# Patient Record
Sex: FEMALE | Race: WHITE | NOT HISPANIC OR LATINO | Employment: OTHER | URBAN - METROPOLITAN AREA
[De-identification: names, ages, dates, MRNs, and addresses within clinical notes are randomized per-mention and may not be internally consistent; named-entity substitution may affect disease eponyms.]

---

## 2017-10-25 ENCOUNTER — TELEPHONE (OUTPATIENT)
Dept: NEUROLOGY | Facility: CLINIC | Age: 42
End: 2017-10-25

## 2017-10-25 ENCOUNTER — OFFICE VISIT (OUTPATIENT)
Dept: NEUROLOGY | Facility: CLINIC | Age: 42
End: 2017-10-25
Payer: MEDICAID

## 2017-10-25 VITALS — WEIGHT: 177 LBS | BODY MASS INDEX: 38.19 KG/M2 | HEIGHT: 57 IN

## 2017-10-25 DIAGNOSIS — Q93.51 ANGELMAN SYNDROME: Chronic | ICD-10-CM

## 2017-10-25 DIAGNOSIS — G40.209 PARTIAL SYMPTOMATIC EPILEPSY WITH COMPLEX PARTIAL SEIZURES, NOT INTRACTABLE, WITHOUT STATUS EPILEPTICUS: Primary | ICD-10-CM

## 2017-10-25 DIAGNOSIS — F72 MR (MENTAL RETARDATION), SEVERE: Chronic | ICD-10-CM

## 2017-10-25 DIAGNOSIS — G40.219 PARTIAL SYMPTOMATIC EPILEPSY WITH COMPLEX PARTIAL SEIZURES, INTRACTABLE, WITHOUT STATUS EPILEPTICUS: ICD-10-CM

## 2017-10-25 DIAGNOSIS — G40.219 PARTIAL EPILEPSY WITH IMPAIRMENT OF CONSCIOUSNESS, INTRACTABLE: Chronic | ICD-10-CM

## 2017-10-25 PROCEDURE — 99203 OFFICE O/P NEW LOW 30 MIN: CPT | Mod: S$PBB,,, | Performed by: STUDENT IN AN ORGANIZED HEALTH CARE EDUCATION/TRAINING PROGRAM

## 2017-10-25 PROCEDURE — 99999 PR PBB SHADOW E&M-EST. PATIENT-LVL II: CPT | Mod: PBBFAC,,, | Performed by: STUDENT IN AN ORGANIZED HEALTH CARE EDUCATION/TRAINING PROGRAM

## 2017-10-25 PROCEDURE — 99212 OFFICE O/P EST SF 10 MIN: CPT | Mod: PBBFAC | Performed by: STUDENT IN AN ORGANIZED HEALTH CARE EDUCATION/TRAINING PROGRAM

## 2017-10-25 RX ORDER — DIVALPROEX SODIUM 500 MG/1
1000 TABLET, FILM COATED, EXTENDED RELEASE ORAL DAILY
Qty: 60 TABLET | Refills: 11 | Status: SHIPPED | OUTPATIENT
Start: 2017-10-25 | End: 2017-10-27 | Stop reason: DRUGHIGH

## 2017-10-25 RX ORDER — HALOPERIDOL 2 MG/1
2 TABLET ORAL ONCE AS NEEDED
Qty: 1 TABLET | Refills: 0 | Status: SHIPPED | OUTPATIENT
Start: 2017-10-25 | End: 2018-11-15

## 2017-10-25 RX ORDER — LEVETIRACETAM 1000 MG/1
TABLET ORAL
Qty: 90 TABLET | Refills: 5 | Status: SHIPPED | OUTPATIENT
Start: 2017-10-25 | End: 2018-06-25 | Stop reason: SDUPTHER

## 2017-10-25 NOTE — TELEPHONE ENCOUNTER
----- Message from Alvaro Aburto sent at 10/25/2017 10:27 AM CDT -----  Contact: Lila apodaca/ [facility] @ ext [phone]  Lila is calling for diagnosis code for haloperidol (HALDOL) 2 MG tablet. Pls call.

## 2017-10-25 NOTE — ASSESSMENT & PLAN NOTE
Continue keppra 1500mg bid and depakote 1000mg bid  Check levels, as well as CBC/CMP, given potential for hepatic toxicity   Haldol 2mg x1 prn for labs; take approximately 30min prior to lab draw  Ok to hold off on EEG at this time.  MRI Brain reviewed.

## 2017-10-25 NOTE — PROGRESS NOTES
"Name: Ting Bartholomew  MRN: [id number]   CSN: [id number]      Date: 10/25/2017    Chief Complaint: complex partial seizures    History of Present Illness (HPI):  Ms. 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  She had previously followed with Dr. Frost in [place].  Her complex partial epilepsy is reports as "staring spells," but she has never had an EEG.  (Her mother states that she would have to be sedated for it.)  These events have occurred since childhood, but where only recognized when she developed generalized tonic clonic seizures, which first occurred four years ago.  She has not had a GTC in about a year and a half.  She was sick about six weeks ago with nausea/vomiting, and missed a few pills, and had a few staring events at that time, but otherwise has been clinically seizure-free for about a year and a half to two years.  She had been taking depakote 1000mg bid and keppra 1500mg bid for several years with good effect, and without obvious side effects.      Of note, she walks well on her own, although has poor depth perception.  She speaks approximately twenty words.  She is able to feed herself with pureed foods, but is usually fed by her family members.  She wears diapers.  She lives at home, but goes to group therapy(?) for socialization a few times a week.    ROS:  Negative for cough, shortness of breath, nausea/vomiting, diarrhea/constipation, or urinary difficulties.  Positive for urinary incontinence.    Past Medical History: The patient  has a past medical history of Angelman syndrome; Developmental delay; Epilepsy (7/18/2014); Mental disorder; and Seizures.    Social History: The patient  reports that she has never smoked. She does not have any smokeless tobacco history on file.    Family History: Their family history includes Diabetes in her mother; Heart disease in her mother; Hyperlipidemia in her mother; " "Hypertension in her mother; No Known Problems in her father.    Allergies: Shellfish containing products     Meds: Depakote 1000mg bid and keppra 1500mg bid    Exam:  Ht 4' 9" (1.448 m)   Wt 80.3 kg (177 lb 0.5 oz)   BMI 38.31 kg/m²     Constitutional  Well-developed, well-nourished, appears stated age.  Edentulous.  Typical hand movements of Angelman's.  Laughing, smiling.  Edentulous.       Cardiovascular  Radial pulses 2+ and symmetric, no LE edema bilaterally   Neurological    * Mental status      - Orientation  Able to state first name     - Memory   Poor     - Attention/concentration  Poor     - Language  Able to repeat "bye bye" when leaving.  Understands when asked to stand up and walk to the door     - Fund of knowledge  Limited             * Cranial nerves       - CN II       - CN III, IV, VI  Extraocular movements full     - CN V  Unable to assess sensation     - CN VII  Face strong and symmetric bilaterally     - CN VIII  Hearing intact bilaterally               * Motor  Muscle bulk normal   * Sensory   Difficult to assess   * Coordination  EMIGDIO    * Gait  Normal casual gait without assistance   * Deep tendon reflexes  2+ and symmetric throughout     Laboratory/Radiological:  - Results:  Lab Visit on 10/25/2017   Component Date Value Ref Range Status    WBC 10/25/2017 5.35  3.90 - 12.70 K/uL Final    RBC 10/25/2017 4.41  4.00 - 5.40 M/uL Final    Hemoglobin 10/25/2017 13.4  12.0 - 16.0 g/dL Final    Hematocrit 10/25/2017 39.7  37.0 - 48.5 % Final    MCV 10/25/2017 90  82 - 98 fL Final    MCH 10/25/2017 30.4  27.0 - 31.0 pg Final    MCHC 10/25/2017 33.8  32.0 - 36.0 g/dL Final    RDW 10/25/2017 12.1  11.5 - 14.5 % Final    Platelets 10/25/2017 131* 150 - 350 K/uL Final    MPV 10/25/2017 10.7  9.2 - 12.9 fL Final    Immature Granulocytes 10/25/2017 0.2  0.0 - 0.5 % Final    Gran # 10/25/2017 2.1  1.8 - 7.7 K/uL Final    Immature Grans (Abs) 10/25/2017 0.01  0.00 - 0.04 K/uL Final    Lymph # " 10/25/2017 2.5  1.0 - 4.8 K/uL Final    Mono # 10/25/2017 0.7  0.3 - 1.0 K/uL Final    Eos # 10/25/2017 0.1  0.0 - 0.5 K/uL Final    Baso # 10/25/2017 0.02  0.00 - 0.20 K/uL Final    nRBC 10/25/2017 0  0 /100 WBC Final    Gran% 10/25/2017 39.0  38.0 - 73.0 % Final    Lymph% 10/25/2017 46.0  18.0 - 48.0 % Final    Mono% 10/25/2017 13.3  4.0 - 15.0 % Final    Eosinophil% 10/25/2017 1.1  0.0 - 8.0 % Final    Basophil% 10/25/2017 0.4  0.0 - 1.9 % Final    Differential Method 10/25/2017 Automated   Final     MRI Brain 10/2014:  Subtle subcortical increased T2 / flair signal in the superior frontal lobes bilaterally as well as the temporal and insular regions which demonstrate no associated hemorrhage or postcontrast enhancement.  Differential considerations include post ictal   state versus infectious or inflammatory process.  Correlation with patient's symptomatology is recommended as well as continued follow-up.    Problem List Items Addressed This Visit        Neuro    MR (mental retardation), severe (Chronic)    Overview     Speaks approximately twenty words, per family         Nonintractable epilepsy with complex partial seizures - Primary    Overview     Staring spells since childhood, first recognized at age 37.         Current Assessment & Plan     Continue keppra 1500mg bid and depakote 1000mg bid  Check levels, as well as CBC/CMP, given potential for hepatic toxicity   Haldol 2mg x1 prn for labs; take approximately 30min prior to lab draw  Ok to hold off on EEG at this time.  MRI Brain reviewed.         Relevant Medications    levETIRAcetam (KEPPRA) 1000 MG tablet    Other Relevant Orders    Comprehensive metabolic panel    CBC auto differential (Completed)    Valproic Acid    Levetiracetam level       Genetic    Angelman syndrome (Chronic)      Other Visit Diagnoses     Partial symptomatic epilepsy with complex partial seizures, intractable, without status epilepticus        Relevant Medications     levETIRAcetam (KEPPRA) 1000 MG tablet    Other Relevant Orders    Comprehensive metabolic panel    CBC auto differential (Completed)    Valproic Acid    Levetiracetam level    Partial epilepsy with impairment of consciousness, intractable  (Chronic)       Relevant Medications    levETIRAcetam (KEPPRA) 1000 MG tablet    Other Relevant Orders    Comprehensive metabolic panel    CBC auto differential (Completed)    Valproic Acid    Levetiracetam level        RTC 6 mo, or sooner, prn.    Isabel Byrne MD  [facility]  PGY-3

## 2017-10-27 RX ORDER — DIVALPROEX SODIUM 500 MG/1
TABLET, FILM COATED, EXTENDED RELEASE ORAL
Qty: 60 TABLET | Refills: 11 | Status: SHIPPED | OUTPATIENT
Start: 2017-10-27 | End: 2018-06-25 | Stop reason: SDUPTHER

## 2017-10-27 NOTE — TELEPHONE ENCOUNTER
Labs reviewed.  Given depakote level of 127, will decrease depakote from 100mg bid to 500mg daily plus 1000mg qhs.  Patient's mother called; plan discussed, and patient's mother amenable.    Isabel Byrne MD

## 2017-12-26 ENCOUNTER — TELEPHONE (OUTPATIENT)
Dept: NEUROLOGY | Facility: CLINIC | Age: 42
End: 2017-12-26

## 2017-12-26 NOTE — TELEPHONE ENCOUNTER
----- Message from Gio Washburn sent at 12/14/2017 12:46 PM CST -----  [facility]   [facility]  Faxed over a 90L form that was not signed by DR. Byrne  On 12/7/2017 and have not received forms back as of now     Carlice with [facility] can be reached at [phone]

## 2017-12-27 NOTE — TELEPHONE ENCOUNTER
----- Message from Clare Peace sent at 12/27/2017  1:27 PM CST -----  Contact: Juliet apodaca/[facility] [place]  Juliet is calling in regards to getting a status on the Doctor signing the 90L for pt.    Juliet would like a call back at [phone].    Thank you

## 2018-01-05 NOTE — TELEPHONE ENCOUNTER
This form (90L) was completed at last visit, 10/25/17, and handed back to the patient's mother and sister.    No new form has been received.    Isabel Byrne MD  [facility]  PGY-3

## 2018-06-25 DIAGNOSIS — G40.219 PARTIAL EPILEPSY WITH IMPAIRMENT OF CONSCIOUSNESS, INTRACTABLE: Chronic | ICD-10-CM

## 2018-06-25 DIAGNOSIS — G40.219 PARTIAL SYMPTOMATIC EPILEPSY WITH COMPLEX PARTIAL SEIZURES, INTRACTABLE, WITHOUT STATUS EPILEPTICUS: ICD-10-CM

## 2018-06-25 NOTE — TELEPHONE ENCOUNTER
----- Message from Ju Layton sent at 6/25/2018 10:51 AM CDT -----  Rx Refill/Request     Is this a Refill or New Rx:  refill  Rx Name and Strength:  levETIRAcetam (KEPPRA) 1000 MG tablet and  divalproex ER (DEPAKOTE) 500 MG Tb24  Preferred Pharmacy with phone number:    [facility]/pharm[facility] #[id number] - [place] - [address]  [place] [address]  Phone: [phone] Fax: [phone]     Communication Preference:Milka (mother) @ [phone]  Additional Information:

## 2018-06-27 RX ORDER — DIVALPROEX SODIUM 500 MG/1
TABLET, FILM COATED, EXTENDED RELEASE ORAL
Qty: 60 TABLET | Refills: 11 | Status: SHIPPED | OUTPATIENT
Start: 2018-06-27 | End: 2018-11-15 | Stop reason: SDUPTHER

## 2018-06-27 RX ORDER — LEVETIRACETAM 1000 MG/1
TABLET ORAL
Qty: 90 TABLET | Refills: 5 | Status: SHIPPED | OUTPATIENT
Start: 2018-06-27 | End: 2018-11-15 | Stop reason: SDUPTHER

## 2018-11-15 PROBLEM — Z51.81 MEDICATION MONITORING ENCOUNTER: Status: ACTIVE | Noted: 2018-11-15

## 2021-05-06 ENCOUNTER — PATIENT MESSAGE (OUTPATIENT)
Dept: RESEARCH | Facility: HOSPITAL | Age: 46
End: 2021-05-06

## 2021-07-01 ENCOUNTER — PATIENT MESSAGE (OUTPATIENT)
Dept: ADMINISTRATIVE | Facility: OTHER | Age: 46
End: 2021-07-01

## 2023-07-05 NOTE — PROGRESS NOTES
Name: Ting Bartholomew  MRN: [id number]   CSN: [id number]      Date: 07/06/2023    Referring physician:  Self     Chief Complaint / Interval History: Abnormal Movements/Epilepsy      History of Present Illness (HPI):    Ms. Bartholomew is a 48 yo with Angelman's who presents to [facility]. She also has epilepsy (general and partial seizures) for which she is on Depakote and Keppra. Her seizures have been well controlled for some time. She rarely will have staring off spells that are short in duration (last on Sunday). She has not had any side effects to her current medication regimen. She has no prior neuroleptic exposure. She does have chronic diarrhea but stays well hydrated. She is eating well without issue.     Current Epilepsy Medications:  Keppra 1500mg BID  Depakote ER 500mg qAM and 1000mg qHS    Prior [facility] Medication Trials:Past Medical History: The patient  has a past medical history of Angelman syndrome, Developmental delay, Epilepsy (7/18/2014), Mental disorder, and Seizures. Scoliosis, microcephaly    Relevant Surgical History:   Past Surgical History:   Procedure Laterality Date    DENTAL SURGERY      EYE SURGERY         Social History: The patient  reports that she has never smoked. She does not have any smokeless tobacco history on file.    Family History: Their family history includes Cancer in her mother; Diabetes in her mother; Heart disease in her mother; Hyperlipidemia in her mother; Hypertension in her mother; Kidney disease in her mother; No Known Problems in her father.  Mother with endometrial cancer.     Allergies: Shellfish containing products     Meds:   Current Outpatient Medications on File Prior to Visit   Medication Sig Dispense Refill    vitamin D 1000 units Tab Take 185 mg by mouth once daily.      [DISCONTINUED] divalproex ER (DEPAKOTE) 500 MG Tb24 PLEASE TAKE 1 TABLET (500MG) IN THE MORNING, AND 2 TABLETS (1000MG) AT NIGHT. 90 tablet 11    [DISCONTINUED] levETIRAcetam (KEPPRA) 750 MG Tab  TAKE 2 TABLETS TWICE A DAY FOR SEIZURES. 120 tablet 11    [DISCONTINUED] melatonin 5 mg Tab Take 10 mg by mouth every evening.       No current facility-administered medications on file prior to visit.       Exam:  Unable to obtain vitals.   Limited exam due to patient cooperation.   Awake, minimally verbal  Moving all extremities spontaneously       Medical Record Review:  Labs, imaging and prior notes reviewed independently.     MRI Brain 10/2014  Subtle subcortical increased T2 / flair signal in the superior frontal lobes bilaterally as well as the temporal and insular regions which demonstrate no associated hemorrhage or postcontrast enhancement.  Differential considerations include post ictal state versus infectious or inflammatory process.  Correlation with patient's symptomatology is recommended as well as continued follow-up.    Diagnoses:          1. Partial symptomatic epilepsy with complex partial seizures, not intractable, without status epilepticus  divalproex 500 MG Tb24    levETIRAcetam (KEPPRA) 750 MG Tab      2. Seizure disorder  divalproex 500 MG Tb24    levETIRAcetam (KEPPRA) 750 MG Tab      3. Insomnia, unspecified type  melatonin (MELATIN) 5 mg      4. Angelman's syndrome            Assessment:  Ms. Bartholomew is a 48 yo woman with angelman's and epilepsy who presents to [facility] care. Her seizures are well managed on her current medication regimen. She is thought to have both partial and generalized seizures.     Plan:  - For epilepsy, Continue LEV 1500mg BID and VPA /1000.  - Consider VPA level in the future if able to obtain.  - In the future, TPM can be considered. Epidiolex is also commonly used in these cases.   - For insomnia, continue melatonin.     RTC in 6 months to see me.     Alison Coelho MD  Division of Movement and Memory Disorders  [facility]  [phone]

## 2023-07-06 ENCOUNTER — OFFICE VISIT (OUTPATIENT)
Dept: NEUROLOGY | Facility: CLINIC | Age: 48
End: 2023-07-06
Payer: MEDICAID

## 2023-07-06 VITALS — HEIGHT: 57 IN | WEIGHT: 178.81 LBS | BODY MASS INDEX: 38.58 KG/M2

## 2023-07-06 DIAGNOSIS — Q93.51 ANGELMAN'S SYNDROME: ICD-10-CM

## 2023-07-06 DIAGNOSIS — G47.00 INSOMNIA, UNSPECIFIED TYPE: ICD-10-CM

## 2023-07-06 DIAGNOSIS — G40.209 PARTIAL SYMPTOMATIC EPILEPSY WITH COMPLEX PARTIAL SEIZURES, NOT INTRACTABLE, WITHOUT STATUS EPILEPTICUS: Primary | ICD-10-CM

## 2023-07-06 DIAGNOSIS — G40.909 SEIZURE DISORDER: ICD-10-CM

## 2023-07-06 PROCEDURE — 99999 PR PBB SHADOW E&M-EST. PATIENT-LVL II: ICD-10-PCS | Mod: PBBFAC,,, | Performed by: STUDENT IN AN ORGANIZED HEALTH CARE EDUCATION/TRAINING PROGRAM

## 2023-07-06 PROCEDURE — 99204 OFFICE O/P NEW MOD 45 MIN: CPT | Mod: S$PBB,,, | Performed by: STUDENT IN AN ORGANIZED HEALTH CARE EDUCATION/TRAINING PROGRAM

## 2023-07-06 PROCEDURE — 99999 PR PBB SHADOW E&M-EST. PATIENT-LVL II: CPT | Mod: PBBFAC,,, | Performed by: STUDENT IN AN ORGANIZED HEALTH CARE EDUCATION/TRAINING PROGRAM

## 2023-07-06 PROCEDURE — 99204 PR OFFICE/OUTPT VISIT, NEW, LEVL IV, 45-59 MIN: ICD-10-PCS | Mod: S$PBB,,, | Performed by: STUDENT IN AN ORGANIZED HEALTH CARE EDUCATION/TRAINING PROGRAM

## 2023-07-06 PROCEDURE — 1159F PR MEDICATION LIST DOCUMENTED IN MEDICAL RECORD: ICD-10-PCS | Mod: CPTII,,, | Performed by: STUDENT IN AN ORGANIZED HEALTH CARE EDUCATION/TRAINING PROGRAM

## 2023-07-06 PROCEDURE — 99212 OFFICE O/P EST SF 10 MIN: CPT | Mod: PBBFAC | Performed by: STUDENT IN AN ORGANIZED HEALTH CARE EDUCATION/TRAINING PROGRAM

## 2023-07-06 PROCEDURE — 3008F BODY MASS INDEX DOCD: CPT | Mod: CPTII,,, | Performed by: STUDENT IN AN ORGANIZED HEALTH CARE EDUCATION/TRAINING PROGRAM

## 2023-07-06 PROCEDURE — 3008F PR BODY MASS INDEX (BMI) DOCUMENTED: ICD-10-PCS | Mod: CPTII,,, | Performed by: STUDENT IN AN ORGANIZED HEALTH CARE EDUCATION/TRAINING PROGRAM

## 2023-07-06 PROCEDURE — 1159F MED LIST DOCD IN RCRD: CPT | Mod: CPTII,,, | Performed by: STUDENT IN AN ORGANIZED HEALTH CARE EDUCATION/TRAINING PROGRAM

## 2023-07-06 RX ORDER — DIVALPROEX SODIUM 500 MG/1
TABLET, FILM COATED, EXTENDED RELEASE ORAL
Qty: 90 TABLET | Refills: 11 | Status: SHIPPED | OUTPATIENT
Start: 2023-07-06

## 2023-07-06 RX ORDER — LEVETIRACETAM 750 MG/1
TABLET ORAL
Qty: 120 TABLET | Refills: 11 | Status: SHIPPED | OUTPATIENT
Start: 2023-07-06

## 2023-07-06 RX ORDER — ACETAMINOPHEN 500 MG
10 TABLET ORAL NIGHTLY
Qty: 30 TABLET | Refills: 11 | Status: SHIPPED | OUTPATIENT
Start: 2023-07-06

## 2024-01-29 NOTE — PROGRESS NOTES
Name: Ting Bartholomew  MRN: [id number]   CSN: [id number]      Date: 01/31/2024    Referring physician:  Self     Chief Complaint / Interval History: Abnormal Movements/Epilepsy    History of Present Illness (HPI):    Ms. Bartholomew is a 48 yo with Angelman's who presents to [facility]. She also has epilepsy (general and partial seizures) for which she is on Depakote and Keppra. Her seizures have been well controlled for some time. She rarely will have staring off spells that are short in duration (last on Sunday). She has not had any side effects to her current medication regimen. She has no prior neuroleptic exposure. She does have chronic diarrhea but stays well hydrated. She is eating well without issue.     Interval History:  Ms. Bartholomew presents for follow-up with her mother. She has had very infrequent BT absence seizures. No changes in AEDs.   Sleeping has improved on melatonin.   No behavioral issues that are of concern.   No tremor or walking issues. No falls.     Current Epilepsy Medications:  Keppra 1500mg BID  Depakote ER 500mg qAM and 1000mg qHS    Prior [facility] Medication Trials:Past Medical History: The patient  has a past medical history of Angelman syndrome, Developmental delay, Epilepsy (7/18/2014), Mental disorder, and Seizures. Scoliosis, microcephaly    Relevant Surgical History:   Past Surgical History:   Procedure Laterality Date    DENTAL SURGERY      EYE SURGERY         Social History: The patient  reports that she has never smoked. She does not have any smokeless tobacco history on file.    Family History: Their family history includes Cancer in her mother; Diabetes in her mother; Heart disease in her mother; Hyperlipidemia in her mother; Hypertension in her mother; Kidney disease in her mother; No Known Problems in her father.  Mother with endometrial cancer.     Allergies: Shellfish containing products     Meds:   Current Outpatient Medications on File Prior to Visit   Medication Sig Dispense  Refill    divalproex 500 MG Tb24 PLEASE TAKE 1 TABLET (500MG) IN THE MORNING, AND 2 TABLETS (1000MG) AT NIGHT. 90 tablet 11    levETIRAcetam (KEPPRA) 750 MG Tab TAKE 2 TABLETS TWICE A DAY FOR SEIZURES. 120 tablet 11    melatonin (MELATIN) 5 mg Take 2 tablets (10 mg total) by mouth every evening. 30 tablet 11    vitamin D 1000 units Tab Take 1,000 Units by mouth once daily.       No current facility-administered medications on file prior to visit.       Exam:  Unable to obtain vitals.   Limited exam due to patient cooperation.   Awake, minimally verbal  Moving all extremities spontaneously - rocking       Medical Record Review:  Labs, imaging and prior notes reviewed independently.     MRI Brain 10/2014  Subtle subcortical increased T2 / flair signal in the superior frontal lobes bilaterally as well as the temporal and insular regions which demonstrate no associated hemorrhage or postcontrast enhancement.  Differential considerations include post ictal state versus infectious or inflammatory process.  Correlation with patient's symptomatology is recommended as well as continued follow-up.    Diagnoses:          1. Seizure disorder        2. Angelman's syndrome              Assessment:  Ms. Bartholomew is a 49 yo woman with angelman's and epilepsy who presents [facility]. Her seizures are well managed on her current medication regimen. She is thought to have both partial and generalized seizures.     Plan:  - For epilepsy, Continue LEV 1500mg BID and VPA /1000.  - Consider VPA level in the future if able to obtain.  - In the future, TPM can be considered. Epidiolex is also commonly used in these cases.   - For insomnia, continue melatonin.     RTC in 6 months to see me.     Alison Coelho MD  Division of Movement and Memory Disorders  [facility]  [phone]

## 2024-01-31 ENCOUNTER — OFFICE VISIT (OUTPATIENT)
Dept: NEUROLOGY | Facility: CLINIC | Age: 49
End: 2024-01-31
Payer: MEDICAID

## 2024-01-31 VITALS — HEIGHT: 57 IN | BODY MASS INDEX: 36.62 KG/M2 | WEIGHT: 169.75 LBS | RESPIRATION RATE: 18 BRPM

## 2024-01-31 DIAGNOSIS — Q93.51 ANGELMAN'S SYNDROME: ICD-10-CM

## 2024-01-31 DIAGNOSIS — G40.909 SEIZURE DISORDER: Primary | ICD-10-CM

## 2024-01-31 PROCEDURE — 99214 OFFICE O/P EST MOD 30 MIN: CPT | Mod: S$PBB,,, | Performed by: STUDENT IN AN ORGANIZED HEALTH CARE EDUCATION/TRAINING PROGRAM

## 2024-01-31 PROCEDURE — 99213 OFFICE O/P EST LOW 20 MIN: CPT | Mod: PBBFAC | Performed by: STUDENT IN AN ORGANIZED HEALTH CARE EDUCATION/TRAINING PROGRAM

## 2024-01-31 PROCEDURE — 99999 PR PBB SHADOW E&M-EST. PATIENT-LVL III: CPT | Mod: PBBFAC,,, | Performed by: STUDENT IN AN ORGANIZED HEALTH CARE EDUCATION/TRAINING PROGRAM

## 2024-01-31 PROCEDURE — 1159F MED LIST DOCD IN RCRD: CPT | Mod: CPTII,,, | Performed by: STUDENT IN AN ORGANIZED HEALTH CARE EDUCATION/TRAINING PROGRAM

## 2024-01-31 PROCEDURE — G2211 COMPLEX E/M VISIT ADD ON: HCPCS | Mod: S$PBB,,, | Performed by: STUDENT IN AN ORGANIZED HEALTH CARE EDUCATION/TRAINING PROGRAM

## 2024-01-31 PROCEDURE — 3008F BODY MASS INDEX DOCD: CPT | Mod: CPTII,,, | Performed by: STUDENT IN AN ORGANIZED HEALTH CARE EDUCATION/TRAINING PROGRAM

## 2024-07-07 DIAGNOSIS — G40.909 SEIZURE DISORDER: ICD-10-CM

## 2024-07-07 DIAGNOSIS — G40.209 PARTIAL SYMPTOMATIC EPILEPSY WITH COMPLEX PARTIAL SEIZURES, NOT INTRACTABLE, WITHOUT STATUS EPILEPTICUS: ICD-10-CM

## 2024-07-08 RX ORDER — LEVETIRACETAM 750 MG/1
TABLET ORAL
Qty: 120 TABLET | Refills: 11 | Status: SHIPPED | OUTPATIENT
Start: 2024-07-08

## 2024-07-15 DIAGNOSIS — G40.209 PARTIAL SYMPTOMATIC EPILEPSY WITH COMPLEX PARTIAL SEIZURES, NOT INTRACTABLE, WITHOUT STATUS EPILEPTICUS: ICD-10-CM

## 2024-07-15 DIAGNOSIS — G40.909 SEIZURE DISORDER: ICD-10-CM

## 2024-07-15 RX ORDER — DIVALPROEX SODIUM 500 MG/1
TABLET, DELAYED RELEASE ORAL
Qty: 90 TABLET | Refills: 11 | Status: SHIPPED | OUTPATIENT
Start: 2024-07-15

## 2024-08-07 ENCOUNTER — OFFICE VISIT (OUTPATIENT)
Dept: NEUROLOGY | Facility: CLINIC | Age: 49
End: 2024-08-07
Payer: MEDICAID

## 2024-08-07 VITALS — BODY MASS INDEX: 38.52 KG/M2 | HEIGHT: 57 IN | WEIGHT: 178.56 LBS

## 2024-08-07 DIAGNOSIS — Q93.51 ANGELMAN'S SYNDROME: Primary | ICD-10-CM

## 2024-08-07 DIAGNOSIS — G47.00 INSOMNIA, UNSPECIFIED TYPE: ICD-10-CM

## 2024-08-07 DIAGNOSIS — G40.909 SEIZURE DISORDER: ICD-10-CM

## 2024-08-07 DIAGNOSIS — G40.209 PARTIAL SYMPTOMATIC EPILEPSY WITH COMPLEX PARTIAL SEIZURES, NOT INTRACTABLE, WITHOUT STATUS EPILEPTICUS: ICD-10-CM

## 2024-08-07 PROCEDURE — 99213 OFFICE O/P EST LOW 20 MIN: CPT | Mod: PBBFAC | Performed by: STUDENT IN AN ORGANIZED HEALTH CARE EDUCATION/TRAINING PROGRAM

## 2024-08-07 PROCEDURE — 99214 OFFICE O/P EST MOD 30 MIN: CPT | Mod: S$PBB,,, | Performed by: STUDENT IN AN ORGANIZED HEALTH CARE EDUCATION/TRAINING PROGRAM

## 2024-08-07 PROCEDURE — 99999 PR PBB SHADOW E&M-EST. PATIENT-LVL III: CPT | Mod: PBBFAC,,, | Performed by: STUDENT IN AN ORGANIZED HEALTH CARE EDUCATION/TRAINING PROGRAM

## 2024-08-07 PROCEDURE — 1159F MED LIST DOCD IN RCRD: CPT | Mod: CPTII,,, | Performed by: STUDENT IN AN ORGANIZED HEALTH CARE EDUCATION/TRAINING PROGRAM

## 2024-08-07 PROCEDURE — 3008F BODY MASS INDEX DOCD: CPT | Mod: CPTII,,, | Performed by: STUDENT IN AN ORGANIZED HEALTH CARE EDUCATION/TRAINING PROGRAM

## 2024-08-07 PROCEDURE — G2211 COMPLEX E/M VISIT ADD ON: HCPCS | Mod: S$PBB,,, | Performed by: STUDENT IN AN ORGANIZED HEALTH CARE EDUCATION/TRAINING PROGRAM

## 2024-08-07 RX ORDER — LOPERAMIDE HCL 2 MG
2 TABLET ORAL 4 TIMES DAILY PRN
COMMUNITY

## 2024-08-07 RX ORDER — ACETAMINOPHEN 325 MG/1
325 TABLET ORAL EVERY 6 HOURS PRN
COMMUNITY

## 2025-02-13 ENCOUNTER — HOSPITAL ENCOUNTER (EMERGENCY)
Facility: HOSPITAL | Age: 50
Discharge: HOME | End: 2025-02-13
Attending: FAMILY MEDICINE
Payer: MEDICAID

## 2025-02-13 VITALS
BODY MASS INDEX: 38.64 KG/M2 | RESPIRATION RATE: 22 BRPM | DIASTOLIC BLOOD PRESSURE: 90 MMHG | OXYGEN SATURATION: 99 % | SYSTOLIC BLOOD PRESSURE: 150 MMHG | WEIGHT: 178.56 LBS | TEMPERATURE: 98 F | HEART RATE: 103 BPM

## 2025-02-13 DIAGNOSIS — R22.41 LOCALIZED SWELLING OF RIGHT LOWER LEG: ICD-10-CM

## 2025-02-13 DIAGNOSIS — M79.672 LEFT FOOT PAIN: ICD-10-CM

## 2025-02-13 DIAGNOSIS — M79.605 LEFT LEG PAIN: ICD-10-CM

## 2025-02-13 DIAGNOSIS — M25.572 LEFT ANKLE PAIN: ICD-10-CM

## 2025-02-13 DIAGNOSIS — M79.89 PAIN AND SWELLING OF LEFT LOWER LEG: ICD-10-CM

## 2025-02-13 DIAGNOSIS — M79.662 PAIN AND SWELLING OF LEFT LOWER LEG: ICD-10-CM

## 2025-02-13 DIAGNOSIS — L03.116 CELLULITIS OF LEFT FOOT: Primary | ICD-10-CM

## 2025-02-13 LAB
ALBUMIN SERPL BCP-MCNC: 3.5 G/DL (ref 3.5–5.2)
ALP SERPL-CCNC: 61 U/L (ref 40–150)
ALT SERPL W/O P-5'-P-CCNC: 9 U/L (ref 10–44)
ANION GAP SERPL CALC-SCNC: 7 MMOL/L (ref 8–16)
AST SERPL-CCNC: 14 U/L (ref 10–40)
BASOPHILS # BLD AUTO: 0.01 K/UL (ref 0–0.2)
BASOPHILS NFR BLD: 0.2 % (ref 0–1.9)
BILIRUB SERPL-MCNC: 0.2 MG/DL (ref 0.1–1)
BUN SERPL-MCNC: 11 MG/DL (ref 6–20)
CALCIUM SERPL-MCNC: 9.1 MG/DL (ref 8.7–10.5)
CHLORIDE SERPL-SCNC: 107 MMOL/L (ref 95–110)
CO2 SERPL-SCNC: 27 MMOL/L (ref 23–29)
CREAT SERPL-MCNC: 0.6 MG/DL (ref 0.5–1.4)
DIFFERENTIAL METHOD BLD: ABNORMAL
EOSINOPHIL # BLD AUTO: 0.1 K/UL (ref 0–0.5)
EOSINOPHIL NFR BLD: 1 % (ref 0–8)
ERYTHROCYTE [DISTWIDTH] IN BLOOD BY AUTOMATED COUNT: 13.2 % (ref 11.5–14.5)
EST. GFR  (NO RACE VARIABLE): >60 ML/MIN/1.73 M^2
GLUCOSE SERPL-MCNC: 97 MG/DL (ref 70–110)
HCT VFR BLD AUTO: 40.7 % (ref 37–48.5)
HGB BLD-MCNC: 13.3 G/DL (ref 12–16)
IMM GRANULOCYTES # BLD AUTO: 0.03 K/UL (ref 0–0.04)
IMM GRANULOCYTES NFR BLD AUTO: 0.6 % (ref 0–0.5)
LYMPHOCYTES # BLD AUTO: 2.2 K/UL (ref 1–4.8)
LYMPHOCYTES NFR BLD: 45.2 % (ref 18–48)
MCH RBC QN AUTO: 29.3 PG (ref 27–31)
MCHC RBC AUTO-ENTMCNC: 32.7 G/DL (ref 32–36)
MCV RBC AUTO: 90 FL (ref 82–98)
MONOCYTES # BLD AUTO: 0.7 K/UL (ref 0.3–1)
MONOCYTES NFR BLD: 13.5 % (ref 4–15)
NEUTROPHILS # BLD AUTO: 2 K/UL (ref 1.8–7.7)
NEUTROPHILS NFR BLD: 39.5 % (ref 38–73)
NRBC BLD-RTO: 0 /100 WBC
PLATELET # BLD AUTO: 165 K/UL (ref 150–450)
PMV BLD AUTO: 9.6 FL (ref 9.2–12.9)
POTASSIUM SERPL-SCNC: 4.2 MMOL/L (ref 3.5–5.1)
PROT SERPL-MCNC: 7.1 G/DL (ref 6–8.4)
RBC # BLD AUTO: 4.54 M/UL (ref 4–5.4)
SODIUM SERPL-SCNC: 141 MMOL/L (ref 136–145)
URATE SERPL-MCNC: 3.5 MG/DL (ref 2.4–5.7)
WBC # BLD AUTO: 4.96 K/UL (ref 3.9–12.7)

## 2025-02-13 PROCEDURE — 99285 EMERGENCY DEPT VISIT HI MDM: CPT | Mod: 25

## 2025-02-13 PROCEDURE — 80053 COMPREHEN METABOLIC PANEL: CPT | Performed by: NURSE PRACTITIONER

## 2025-02-13 PROCEDURE — 85025 COMPLETE CBC W/AUTO DIFF WBC: CPT | Performed by: NURSE PRACTITIONER

## 2025-02-13 PROCEDURE — 84550 ASSAY OF BLOOD/URIC ACID: CPT | Performed by: NURSE PRACTITIONER

## 2025-02-13 RX ORDER — NAPROXEN 500 MG/1
500 TABLET ORAL EVERY 12 HOURS PRN
Qty: 10 TABLET | Refills: 0 | Status: SHIPPED | OUTPATIENT
Start: 2025-02-13

## 2025-02-13 RX ORDER — CLINDAMYCIN HYDROCHLORIDE 300 MG/1
300 CAPSULE ORAL EVERY 6 HOURS
Qty: 28 CAPSULE | Refills: 0 | Status: SHIPPED | OUTPATIENT
Start: 2025-02-13 | End: 2025-02-20

## 2025-02-13 NOTE — ED PROVIDER NOTES
Encounter Date: 2/13/2025       History     Chief Complaint   Patient presents with    Joint Swelling     Special needs patient to ED with family complaining of left ankle/foot swelling that began 2-3 days. Unknown if traumatic in nature but able to bear weight and walk.     Ting Bartholomew is a 49 y.o. female with PMH of Angelman Syndrome, epilepsy, seizure disorder presenting to the ED for evaluation of right leg swelling.  Patient presents with mother who reports that she noticed about 3 days ago swelling in her R foot. She denies injury or any known trauma. Typically, if she falls, she needs help getting up. Swelling has progressively worsened and no includes her lower leg up to her calf. She feels like it is painful for her bc she grimaces with movement or touch. She has redness over the dorsum of her foot; mother concerned that she might have been bitten by a spider but there is no visible bite debbie.     The history is provided by a relative.     Review of patient's allergies indicates:   Allergen Reactions    Shellfish containing products Diarrhea     Past Medical History:   Diagnosis Date    Angelman syndrome     Developmental delay     Epilepsy 7/18/2014    Mental disorder     Seizures      Past Surgical History:   Procedure Laterality Date    DENTAL SURGERY      EYE SURGERY       Family History   Problem Relation Name Age of Onset    Kidney disease Mother      Cancer Mother          endometrial    Diabetes Mother      Hypertension Mother      Hyperlipidemia Mother      Heart disease Mother      No Known Problems Father       Social History     Tobacco Use    Smoking status: Never     Review of Systems   Unable to perform ROS: Patient nonverbal       Physical Exam     Initial Vitals   BP Pulse Resp Temp SpO2   02/13/25 1100 02/13/25 1100 02/13/25 1100 02/13/25 1101 02/13/25 1100   (!) 150/90 103 20 98.3 °F (36.8 °C) 97 %      MAP       --                Physical Exam    Nursing note and vitals  reviewed.  Constitutional: She appears well-developed and well-nourished.   HENT:   Head: Normocephalic and atraumatic.   Eyes: Conjunctivae and EOM are normal. Pupils are equal, round, and reactive to light.   Neck: Neck supple.   Cardiovascular:  Normal rate, regular rhythm, normal heart sounds and intact distal pulses.           Pulmonary/Chest: Breath sounds normal.   Abdominal: Abdomen is soft. Bowel sounds are normal.   Musculoskeletal:         General: Normal range of motion.      Cervical back: Neck supple.      Right lower leg: Swelling present. Edema present.      Right ankle: Swelling present.      Right foot: Normal capillary refill. Swelling (R dorsal foot) present. Normal pulse.     Neurological: She is alert and oriented to person, place, and time. She has normal strength.   Skin: Skin is warm and dry.   Psychiatric: She has a normal mood and affect. Her behavior is normal. Judgment and thought content normal.         ED Course   Procedures  Labs Reviewed   CBC W/ AUTO DIFFERENTIAL - Abnormal       Result Value    WBC 4.96      RBC 4.54      Hemoglobin 13.3      Hematocrit 40.7      MCV 90      MCH 29.3      MCHC 32.7      RDW 13.2      Platelets 165      MPV 9.6      Immature Granulocytes 0.6 (*)     Gran # (ANC) 2.0      Immature Grans (Abs) 0.03      Lymph # 2.2      Mono # 0.7      Eos # 0.1      Baso # 0.01      nRBC 0      Gran % 39.5      Lymph % 45.2      Mono % 13.5      Eosinophil % 1.0      Basophil % 0.2      Differential Method Automated     COMPREHENSIVE METABOLIC PANEL - Abnormal    Sodium 141      Potassium 4.2      Chloride 107      CO2 27      Glucose 97      BUN 11      Creatinine 0.6      Calcium 9.1      Total Protein 7.1      Albumin 3.5      Total Bilirubin 0.2      Alkaline Phosphatase 61      AST 14      ALT 9 (*)     eGFR >60      Anion Gap 7 (*)    URIC ACID    Uric Acid 3.5            Imaging Results              US Lower Extremity Veins Left (Final result)  Result time  02/13/25 13:24:06      Final result by Margareth Huang MD (02/13/25 13:24:06)                   Impression:      No evidence of deep venous thrombosis in the left lower extremity.      Electronically signed by: Margareth Huang MD  Date:    02/13/2025  Time:    13:24               Narrative:    EXAMINATION:  US LOWER EXTREMITY VEINS LEFT    CLINICAL HISTORY:  Pain in left lower leg    TECHNIQUE:  Duplex and color flow Doppler evaluation and graded compression of the left lower extremity veins was performed.    COMPARISON:  None    FINDINGS:  Left thigh veins: The common femoral, femoral, popliteal, upper greater saphenous, and deep femoral veins are patent and free of thrombus. The veins are normally compressible and have normal phasic flow and augmentation response.    Left calf veins: The visualized calf veins are patent.    Contralateral CFV: The contralateral (right) common femoral vein is patent and free of thrombus.    Miscellaneous: None                                       X-Ray Foot Complete Left (Final result)  Result time 02/13/25 12:02:39   Procedure changed from X-Ray Foot Complete Right     Final result by Margareth Huang MD (02/13/25 12:02:39)                   Impression:      As above.      Electronically signed by: Margareth Huang MD  Date:    02/13/2025  Time:    12:02               Narrative:    EXAMINATION:  XR FOOT COMPLETE 3 VIEW LEFT    CLINICAL HISTORY:  pain;.  Pain in left foot    TECHNIQUE:  AP, lateral and oblique views of the left foot were performed.    COMPARISON:  None    FINDINGS:  No fracture or dislocation.  Lisfranc articulation is congruent.  Cartilage spaces are maintained.  Significant soft tissue swelling of the dorsum of forefoot.                                       X-Ray Ankle Complete Left (Final result)  Result time 02/13/25 12:01:22   Procedure changed from X-Ray Ankle Complete Left     Final result by Margareth Huang MD (02/13/25 12:01:22)                    Impression:      As above.      Electronically signed by: Margareth Huang MD  Date:    02/13/2025  Time:    12:01               Narrative:    EXAMINATION:  XR ANKLE COMPLETE 3 VIEW LEFT    CLINICAL HISTORY:  pain;Pain in left ankle and joints of left foot    TECHNIQUE:  Three views of the left ankle    COMPARISON:  None.    FINDINGS:  The alignment is within normal limits.  No displaced fractures identified.  No evidence of lytic or blastic lesions.Joint spaces are unremarkable.Soft tissue swelling about the ankle extending into the foot.                                       X-Ray Tibia Fibula 2 View Left (Final result)  Result time 02/13/25 12:00:33   Procedure changed from X-Ray Tibia Fibula 2 View Left     Final result by Margareth Huang MD (02/13/25 12:00:33)                   Impression:      As above.      Electronically signed by: Margareth Huang MD  Date:    02/13/2025  Time:    12:00               Narrative:    EXAMINATION:  XR TIBIA FIBULA 2 VIEW LEFT    CLINICAL HISTORY:  pain;Pain in left lower leg    TECHNIQUE:  Two views of the left tibia and fibula    COMPARISON:  None.    FINDINGS:  Soft tissue swelling about the left lower extremity.  No underlying fracture or dislocation is seen.  No osseous erosions.                                       US Lower Extremity Veins Right (Final result)  Result time 02/13/25 11:48:00      Final result by Margareth Huang MD (02/13/25 11:48:00)                   Impression:      No definite thrombosis seen within the right common femoral vein through the popliteal vein.  The calf veins could not be examined secondary to patient's inability to cooperate for the examination.      Electronically signed by: Margareth Huang MD  Date:    02/13/2025  Time:    11:48               Narrative:    EXAMINATION:  US LOWER EXTREMITY VEINS RIGHT    CLINICAL HISTORY:  Localized swelling, mass and lump, right lower limb    TECHNIQUE:  Duplex and color flow Doppler evaluation and  graded compression of the right lower extremity veins was performed.    COMPARISON:  None    FINDINGS:  The study was significantly limited secondary to the patient being combative during the examination.  No definite thrombosis is seen within the right common femoral vein extending through the popliteal vein.  The calf veins were not examined.                                       Medications - No data to display  Medical Decision Making  Evaluation of a 49-year-old female with left lower leg, ankle, and foot swelling.  No known injury or trauma  Presents with + swelling to mainly dorsum of left foot with mild redness over foot.   Patient has a good pedal pulse and capillary refill    Differential diagnosis includes cellulitis, fracture, sprain, strain, DVT, gout    Amount and/or Complexity of Data Reviewed  Labs: ordered. Decision-making details documented in ED Course.  Radiology: ordered. Decision-making details documented in ED Course.    Risk  Prescription drug management.  Risk Details: Stable for discharge home.  Lab workup with no leukocytosis.  Negative uric acid.  X-ray of the left tib-fib, ankle, and foot shows soft tissue swelling with no fracture.  Negative DVT ultrasound of the left lower extremity.  Given mild redness, will discharge with clindamycin for possible cellulitis.  Localized reaction to insect bite also in differential.  Instructed to elevate leg to help with swelling.  May apply ice.  Close outpatient follow-up with PCP in the next 2 days. Patient/caregiver voices understanding and feels comfortable with discharge plan.      The patient acknowledges that close follow up with medical provider is required. Instructed to follow up with PCP within 2 days. Patient was given specific return precautions. The patient agrees to comply with all instruction and directions given in the ER.                                        Clinical Impression:  Final diagnoses:  [R22.41] Localized swelling of  right lower leg  [M79.672] Left foot pain  [M79.605] Left leg pain  [M25.572] Left ankle pain  [M79.662, M79.89] Pain and swelling of left lower leg  [L03.116] Cellulitis of left foot (Primary)          ED Disposition Condition    Discharge Stable          ED Prescriptions       Medication Sig Dispense Start Date End Date Auth. Provider    clindamycin (CLEOCIN) 300 MG capsule Take 1 capsule (300 mg total) by mouth every 6 (six) hours. for 7 days 28 capsule 2/13/2025 2/20/2025 Thuy Layton NP    naproxen (NAPROSYN) 500 MG tablet Take 1 tablet (500 mg total) by mouth every 12 (twelve) hours as needed (pain). Take with food. 10 tablet 2/13/2025 -- Thuy Layton NP          Follow-up Information       Follow up With Specialties Details Why Contact Info    Kirk Rucker MD Internal Medicine Schedule an appointment as soon as possible for a visit in 2 days  [address] [facility] [address]  [facility] [address]  [phone]               Thuy Layton NP  02/13/25 [id number]

## 2025-03-20 ENCOUNTER — OFFICE VISIT (OUTPATIENT)
Dept: NEUROLOGY | Facility: CLINIC | Age: 50
End: 2025-03-20
Payer: MEDICAID

## 2025-03-20 VITALS
BODY MASS INDEX: 36.89 KG/M2 | SYSTOLIC BLOOD PRESSURE: 110 MMHG | HEART RATE: 90 BPM | WEIGHT: 183 LBS | HEIGHT: 59 IN | RESPIRATION RATE: 18 BRPM | DIASTOLIC BLOOD PRESSURE: 82 MMHG | OXYGEN SATURATION: 99 %

## 2025-03-20 DIAGNOSIS — Q93.51 ANGELMAN'S SYNDROME: ICD-10-CM

## 2025-03-20 DIAGNOSIS — G40.909 SEIZURE DISORDER: Primary | ICD-10-CM

## 2025-03-20 DIAGNOSIS — G40.209 PARTIAL SYMPTOMATIC EPILEPSY WITH COMPLEX PARTIAL SEIZURES, NOT INTRACTABLE, WITHOUT STATUS EPILEPTICUS: ICD-10-CM

## 2025-03-20 DIAGNOSIS — F51.01 PRIMARY INSOMNIA: ICD-10-CM

## 2025-03-20 PROCEDURE — 99213 OFFICE O/P EST LOW 20 MIN: CPT | Mod: PBBFAC | Performed by: STUDENT IN AN ORGANIZED HEALTH CARE EDUCATION/TRAINING PROGRAM

## 2025-03-20 PROCEDURE — G2211 COMPLEX E/M VISIT ADD ON: HCPCS | Mod: S$PBB,,, | Performed by: STUDENT IN AN ORGANIZED HEALTH CARE EDUCATION/TRAINING PROGRAM

## 2025-03-20 PROCEDURE — 99214 OFFICE O/P EST MOD 30 MIN: CPT | Mod: S$PBB,,, | Performed by: STUDENT IN AN ORGANIZED HEALTH CARE EDUCATION/TRAINING PROGRAM

## 2025-03-20 PROCEDURE — 3079F DIAST BP 80-89 MM HG: CPT | Mod: CPTII,,, | Performed by: STUDENT IN AN ORGANIZED HEALTH CARE EDUCATION/TRAINING PROGRAM

## 2025-03-20 PROCEDURE — 1159F MED LIST DOCD IN RCRD: CPT | Mod: CPTII,,, | Performed by: STUDENT IN AN ORGANIZED HEALTH CARE EDUCATION/TRAINING PROGRAM

## 2025-03-20 PROCEDURE — 3074F SYST BP LT 130 MM HG: CPT | Mod: CPTII,,, | Performed by: STUDENT IN AN ORGANIZED HEALTH CARE EDUCATION/TRAINING PROGRAM

## 2025-03-20 PROCEDURE — 3008F BODY MASS INDEX DOCD: CPT | Mod: CPTII,,, | Performed by: STUDENT IN AN ORGANIZED HEALTH CARE EDUCATION/TRAINING PROGRAM

## 2025-03-20 PROCEDURE — 99999 PR PBB SHADOW E&M-EST. PATIENT-LVL III: CPT | Mod: PBBFAC,,, | Performed by: STUDENT IN AN ORGANIZED HEALTH CARE EDUCATION/TRAINING PROGRAM

## 2025-03-20 RX ORDER — DIVALPROEX SODIUM 500 MG/1
TABLET, DELAYED RELEASE ORAL
Qty: 90 TABLET | Refills: 11 | Status: SHIPPED | OUTPATIENT
Start: 2025-03-20

## 2025-03-20 RX ORDER — LEVETIRACETAM 750 MG/1
TABLET ORAL
Qty: 120 TABLET | Refills: 11 | Status: SHIPPED | OUTPATIENT
Start: 2025-03-20

## 2025-03-20 NOTE — PROGRESS NOTES
"Name: Ting Bartholomew  MRN: [id number]   CSN: [id number]      Date: 03/20/2025    Referring physician:  Self     Chief Complaint / Interval History: Abnormal Movements/Epilepsy    History of Present Illness (HPI):    Ms. Bartholomew is a 46 yo with Angelman's who presents to [facility] care. She also has epilepsy (general and partial seizures) for which she is on Depakote and Keppra. Her seizures have been well controlled for some time. She rarely will have staring off spells that are short in duration (last on Sunday). She has not had any side effects to her current medication regimen. She has no prior neuroleptic exposure. She does have chronic diarrhea but stays well hydrated. She is eating well without issue.     Interval History:  Ms. Bartholomew presents for follow-up with her mother.     Had an ED visit for cellulitis involving her LLE.   No breakthrough seizures outside of her normal "staring spells".   No new behavorial issues.   No new movement issues.   Sleeping ok with melatonin.   Eating and drinking ok. BMs ok.     Current Epilepsy Medications:  Keppra 1500mg BID  Depakote ER 500mg qAM and 1000mg qHS    Prior Mvmt Medication Trials:Past Medical History: The patient  has a past medical history of Angelman syndrome, Developmental delay, Epilepsy (7/18/2014), Mental disorder, and Seizures. Scoliosis, microcephaly    Relevant Surgical History:   Past Surgical History:   Procedure Laterality Date    DENTAL SURGERY      EYE SURGERY         Social History: The patient  reports that she has never smoked. She does not have any smokeless tobacco history on file.    Family History: Their family history includes Cancer in her mother; Diabetes in her mother; Heart disease in her mother; Hyperlipidemia in her mother; Hypertension in her mother; Kidney disease in her mother; No Known Problems in her father.  Mother with endometrial cancer.     Allergies: Shellfish containing products     Meds:   Current Outpatient Medications on File " Prior to Visit   Medication Sig Dispense Refill    acetaminophen (TYLENOL) 325 MG tablet Take 325 mg by mouth every 6 (six) hours as needed for Pain.      loperamide (IMODIUM A-D) 2 mg Tab Take 2 mg by mouth 4 (four) times daily as needed.      melatonin (MELATIN) 5 mg Take 2 tablets (10 mg total) by mouth every evening. 30 tablet 11    naproxen (NAPROSYN) 500 MG tablet Take 1 tablet (500 mg total) by mouth every 12 (twelve) hours as needed (pain). Take with food. 10 tablet 0    vitamin D 1000 units Tab Take 1,000 Units by mouth once daily.      [DISCONTINUED] divalproex (DEPAKOTE) 500 MG TbEC PLEASE TAKE 1 TABLET (500MG) IN THE MORNING, AND 2 TABLETS (1000MG) AT NIGHT. 90 tablet 11    [DISCONTINUED] levETIRAcetam (KEPPRA) 750 MG Tab TAKE 2 TABLETS TWICE A DAY FOR SEIZURES. 120 tablet 11     No current facility-administered medications on file prior to visit.       Exam:  Unable to obtain vitals.   Limited exam due to patient cooperation.   Awake, minimally verbal  Moving all extremities spontaneously - rocking       Medical Record Review:  Labs, imaging and prior notes reviewed independently.     MRI Brain 10/2014  Subtle subcortical increased T2 / flair signal in the superior frontal lobes bilaterally as well as the temporal and insular regions which demonstrate no associated hemorrhage or postcontrast enhancement.  Differential considerations include post ictal state versus infectious or inflammatory process.  Correlation with patient's symptomatology is recommended as well as continued follow-up.    Diagnoses:          1. Seizure disorder  levETIRAcetam (KEPPRA) 750 MG Tab    divalproex (DEPAKOTE) 500 MG TbEC      2. Partial symptomatic epilepsy with complex partial seizures, not intractable, without status epilepticus  levETIRAcetam (KEPPRA) 750 MG Tab    divalproex (DEPAKOTE) 500 MG TbEC              Assessment:  Ms. Bartholomew is a 48 yo woman with angelman's and epilepsy who presents for seizure mgmt. Her  seizures are well managed on her current medication regimen. She is thought to have both partial and generalized seizures.     Plan:  - For epilepsy, Continue LEV 1500mg BID and VPA /1000.  - Consider VPA level in the future if able to obtain.  - In the future, TPM can be considered. Epidiolex is also commonly used in these cases.   - For insomnia, continue melatonin.     RTC in 6 months to see me.     Alison Coelho MD  Division of Movement and Memory Disorders  [facility]  [phone]